# Patient Record
Sex: FEMALE | Race: ASIAN | NOT HISPANIC OR LATINO | URBAN - METROPOLITAN AREA
[De-identification: names, ages, dates, MRNs, and addresses within clinical notes are randomized per-mention and may not be internally consistent; named-entity substitution may affect disease eponyms.]

---

## 2023-11-23 ENCOUNTER — HOSPITAL ENCOUNTER (EMERGENCY)
Facility: HOSPITAL | Age: 48
Discharge: HOME | End: 2023-11-23
Attending: EMERGENCY MEDICINE
Payer: COMMERCIAL

## 2023-11-23 VITALS
DIASTOLIC BLOOD PRESSURE: 64 MMHG | OXYGEN SATURATION: 100 % | RESPIRATION RATE: 18 BRPM | TEMPERATURE: 98 F | BODY MASS INDEX: 18.31 KG/M2 | SYSTOLIC BLOOD PRESSURE: 128 MMHG | HEART RATE: 74 BPM | WEIGHT: 97 LBS | HEIGHT: 61 IN

## 2023-11-23 DIAGNOSIS — I31.39 PERICARDIAL EFFUSION WITHOUT CARDIAC TAMPONADE: ICD-10-CM

## 2023-11-23 DIAGNOSIS — R10.32 LEFT LOWER QUADRANT ABDOMINAL PAIN: Primary | ICD-10-CM

## 2023-11-23 LAB
ALBUMIN SERPL BCP-MCNC: 3.9 G/DL (ref 3.5–5.2)
ALP SERPL-CCNC: 46 U/L (ref 55–135)
ALT SERPL W/O P-5'-P-CCNC: 13 U/L (ref 10–44)
ANION GAP SERPL CALC-SCNC: 8 MMOL/L (ref 8–16)
AST SERPL-CCNC: 16 U/L (ref 10–40)
B-HCG UR QL: NEGATIVE
BASOPHILS # BLD AUTO: 0.05 K/UL (ref 0–0.2)
BASOPHILS NFR BLD: 0.7 % (ref 0–1.9)
BILIRUB SERPL-MCNC: 0.3 MG/DL (ref 0.1–1)
BILIRUB UR QL STRIP: NEGATIVE
BILIRUB UR QL STRIP: NEGATIVE
BUN SERPL-MCNC: 18 MG/DL (ref 6–20)
CALCIUM SERPL-MCNC: 8.8 MG/DL (ref 8.7–10.5)
CHLORIDE SERPL-SCNC: 107 MMOL/L (ref 95–110)
CLARITY UR: CLEAR
CLARITY UR: CLEAR
CO2 SERPL-SCNC: 22 MMOL/L (ref 23–29)
COLOR UR: COLORLESS
COLOR UR: COLORLESS
CREAT SERPL-MCNC: 0.9 MG/DL (ref 0.5–1.4)
CTP QC/QA: YES
DIFFERENTIAL METHOD: ABNORMAL
EOSINOPHIL # BLD AUTO: 0.2 K/UL (ref 0–0.5)
EOSINOPHIL NFR BLD: 3 % (ref 0–8)
ERYTHROCYTE [DISTWIDTH] IN BLOOD BY AUTOMATED COUNT: 13.5 % (ref 11.5–14.5)
EST. GFR  (NO RACE VARIABLE): >60 ML/MIN/1.73 M^2
GLUCOSE SERPL-MCNC: 93 MG/DL (ref 70–110)
GLUCOSE UR QL STRIP: NEGATIVE
GLUCOSE UR QL STRIP: NEGATIVE
HCT VFR BLD AUTO: 41.6 % (ref 37–48.5)
HGB BLD-MCNC: 14 G/DL (ref 12–16)
HGB UR QL STRIP: NEGATIVE
HGB UR QL STRIP: NEGATIVE
IMM GRANULOCYTES # BLD AUTO: 0.01 K/UL (ref 0–0.04)
IMM GRANULOCYTES NFR BLD AUTO: 0.1 % (ref 0–0.5)
KETONES UR QL STRIP: NEGATIVE
KETONES UR QL STRIP: NEGATIVE
LEUKOCYTE ESTERASE UR QL STRIP: NEGATIVE
LEUKOCYTE ESTERASE UR QL STRIP: NEGATIVE
LIPASE SERPL-CCNC: 43 U/L (ref 4–60)
LYMPHOCYTES # BLD AUTO: 1.7 K/UL (ref 1–4.8)
LYMPHOCYTES NFR BLD: 23.7 % (ref 18–48)
MCH RBC QN AUTO: 30.4 PG (ref 27–31)
MCHC RBC AUTO-ENTMCNC: 33.7 G/DL (ref 32–36)
MCV RBC AUTO: 90 FL (ref 82–98)
MONOCYTES # BLD AUTO: 0.7 K/UL (ref 0.3–1)
MONOCYTES NFR BLD: 9.5 % (ref 4–15)
NEUTROPHILS # BLD AUTO: 4.4 K/UL (ref 1.8–7.7)
NEUTROPHILS NFR BLD: 63 % (ref 38–73)
NITRITE UR QL STRIP: NEGATIVE
NITRITE UR QL STRIP: NEGATIVE
NRBC BLD-RTO: 0 /100 WBC
PH UR STRIP: 6 [PH] (ref 5–8)
PH UR STRIP: 6 [PH] (ref 5–8)
PLATELET # BLD AUTO: 304 K/UL (ref 150–450)
PMV BLD AUTO: 9 FL (ref 9.2–12.9)
POTASSIUM SERPL-SCNC: 4.1 MMOL/L (ref 3.5–5.1)
PROT SERPL-MCNC: 7.3 G/DL (ref 6–8.4)
PROT UR QL STRIP: NEGATIVE
PROT UR QL STRIP: NEGATIVE
RBC # BLD AUTO: 4.61 M/UL (ref 4–5.4)
SODIUM SERPL-SCNC: 137 MMOL/L (ref 136–145)
SP GR UR STRIP: 1 (ref 1–1.03)
SP GR UR STRIP: 1 (ref 1–1.03)
URN SPEC COLLECT METH UR: ABNORMAL
URN SPEC COLLECT METH UR: ABNORMAL
UROBILINOGEN UR STRIP-ACNC: NEGATIVE EU/DL
UROBILINOGEN UR STRIP-ACNC: NEGATIVE EU/DL
WBC # BLD AUTO: 7.04 K/UL (ref 3.9–12.7)

## 2023-11-23 PROCEDURE — 96374 THER/PROPH/DIAG INJ IV PUSH: CPT

## 2023-11-23 PROCEDURE — 81025 URINE PREGNANCY TEST: CPT | Performed by: PHYSICIAN ASSISTANT

## 2023-11-23 PROCEDURE — 96375 TX/PRO/DX INJ NEW DRUG ADDON: CPT

## 2023-11-23 PROCEDURE — 63600175 PHARM REV CODE 636 W HCPCS: Performed by: STUDENT IN AN ORGANIZED HEALTH CARE EDUCATION/TRAINING PROGRAM

## 2023-11-23 PROCEDURE — 80053 COMPREHEN METABOLIC PANEL: CPT | Performed by: STUDENT IN AN ORGANIZED HEALTH CARE EDUCATION/TRAINING PROGRAM

## 2023-11-23 PROCEDURE — 85014 HEMATOCRIT: CPT

## 2023-11-23 PROCEDURE — 81003 URINALYSIS AUTO W/O SCOPE: CPT | Performed by: PHYSICIAN ASSISTANT

## 2023-11-23 PROCEDURE — 84132 ASSAY OF SERUM POTASSIUM: CPT

## 2023-11-23 PROCEDURE — 84295 ASSAY OF SERUM SODIUM: CPT

## 2023-11-23 PROCEDURE — 93005 ELECTROCARDIOGRAM TRACING: CPT

## 2023-11-23 PROCEDURE — 85025 COMPLETE CBC W/AUTO DIFF WBC: CPT | Performed by: STUDENT IN AN ORGANIZED HEALTH CARE EDUCATION/TRAINING PROGRAM

## 2023-11-23 PROCEDURE — 99285 EMERGENCY DEPT VISIT HI MDM: CPT | Mod: 25

## 2023-11-23 PROCEDURE — 81003 URINALYSIS AUTO W/O SCOPE: CPT | Mod: 91 | Performed by: STUDENT IN AN ORGANIZED HEALTH CARE EDUCATION/TRAINING PROGRAM

## 2023-11-23 PROCEDURE — 82565 ASSAY OF CREATININE: CPT

## 2023-11-23 PROCEDURE — 83690 ASSAY OF LIPASE: CPT | Performed by: STUDENT IN AN ORGANIZED HEALTH CARE EDUCATION/TRAINING PROGRAM

## 2023-11-23 PROCEDURE — 82330 ASSAY OF CALCIUM: CPT

## 2023-11-23 PROCEDURE — 93010 ELECTROCARDIOGRAM REPORT: CPT | Mod: ,,, | Performed by: INTERNAL MEDICINE

## 2023-11-23 PROCEDURE — 99900035 HC TECH TIME PER 15 MIN (STAT)

## 2023-11-23 PROCEDURE — 93010 EKG 12-LEAD: ICD-10-PCS | Mod: ,,, | Performed by: INTERNAL MEDICINE

## 2023-11-23 RX ORDER — KETOROLAC TROMETHAMINE 30 MG/ML
15 INJECTION, SOLUTION INTRAMUSCULAR; INTRAVENOUS
Status: COMPLETED | OUTPATIENT
Start: 2023-11-23 | End: 2023-11-23

## 2023-11-23 RX ORDER — MORPHINE SULFATE 4 MG/ML
4 INJECTION, SOLUTION INTRAMUSCULAR; INTRAVENOUS
Status: COMPLETED | OUTPATIENT
Start: 2023-11-23 | End: 2023-11-23

## 2023-11-23 RX ORDER — ONDANSETRON 2 MG/ML
4 INJECTION INTRAMUSCULAR; INTRAVENOUS
Status: COMPLETED | OUTPATIENT
Start: 2023-11-23 | End: 2023-11-23

## 2023-11-23 RX ADMIN — KETOROLAC TROMETHAMINE 15 MG: 30 INJECTION, SOLUTION INTRAMUSCULAR; INTRAVENOUS at 09:11

## 2023-11-23 RX ADMIN — MORPHINE SULFATE 4 MG: 4 INJECTION, SOLUTION INTRAMUSCULAR; INTRAVENOUS at 09:11

## 2023-11-23 RX ADMIN — ONDANSETRON 4 MG: 2 INJECTION INTRAMUSCULAR; INTRAVENOUS at 09:11

## 2023-11-24 NOTE — DISCHARGE INSTRUCTIONS
You were evaluated and treated in the emergency department today. You were found to have a diagnoses that can be managed well at home, however that requires your commitment to getting better.   Problem-Specific Instructions:  Follow-up with OBGYN.  Follow-up primary care.  Follow-up with cardiology.  Return to the emergency room for any new or worsening symptoms..      Ensure you follow up with your Primary Care Provider or any additional providers listed on this discharge sheet. While you may be healthy enough to go home today, I cannot predict the exact course of your diagnoses. As such, it is your responsibility to monitor symptoms, follow-up with another healthcare provider, or return to the emergency room for new or worsening concerns. Unless otherwise instructed, continue all home medications and any new medications prescribed to you in the Emergency Department.   General Maintenance: Ensure adequate hydration to prevent prolonged illness and recovery. Monitor your caloric intake with a goal of obtaining and maintaining a healthy weight to help prevent the development of chronic and life-threatening medical conditions. Start healthy fitness habits and aim for a goal of 30 minutes to an hour of exercise 3-5 times a week. Avoid the use of tobacco, alcohol, and illicit drugs as these may be detrimental to your health goals.

## 2023-11-24 NOTE — ED TRIAGE NOTES
Pt arrived to ED with a c/o LLQ abdominal pain radiating to back since Sunday increased severity since tonight along with nausea. Pt was seen by her ob/gyn and was treated with antibiotic for UTI but mentioned her urine culture came negative. Pt is restless on arrival to ED. Pt is alert and oriented.

## 2023-11-24 NOTE — ED PROVIDER NOTES
Encounter Date: 2023    SCRIBE #1 NOTE: I, Padmaja Styles, am scribing for, and in the presence of,  Davian Quigley PA-C. I have scribed the following portions of the note - Other sections scribed: HPI/ROS.       History     Chief Complaint   Patient presents with    Abdominal Pain     Pt reports shooting sharp pain to LLQ that radiates to her back, urinary frequency, dysuria since .  She had a obgyn appt the following Monday was tested for UTI which was negative.       48 y.o. female, with a PMHx of HLD, who presents to the ED with abdominal pain onset . Patient reports left lower quadrant sharp, shooting pain that radiates bilaterally to lower back.  Patient tells me it is intermittent in nature but acutely worsened today.  She is been taking Tylenol and Motrin as needed for pain with improvement.  There is associated nausea and vomiting as well as urinary frequency. No other exacerbating or alleviating factors.     Complains of episodes of pain in which she has leg numbness but denies perineal numbness, gait abnormality, loss or retention of bowel or bladder.  Denies fever, chills.  Denies history of IV drug use.  Denies history of malignancy.      The history is provided by the patient and the spouse. No  was used.     Review of patient's allergies indicates:  No Known Allergies  Past Medical History:   Diagnosis Date    Hyperlipidemia      Past Surgical History:   Procedure Laterality Date     SECTION      TUBAL LIGATION       Family History   Problem Relation Age of Onset    Breast cancer Neg Hx     Colon cancer Neg Hx     Ovarian cancer Neg Hx      Social History     Tobacco Use    Smoking status: Never     Review of Systems   Constitutional:  Negative for chills and fever.   HENT:  Negative for congestion, rhinorrhea and sore throat.    Eyes:  Negative for visual disturbance.   Respiratory:  Negative for cough and shortness of breath.    Cardiovascular:  Negative  for chest pain.   Gastrointestinal:  Positive for abdominal pain (LLQ), nausea and vomiting. Negative for diarrhea.   Genitourinary:  Positive for frequency. Negative for dysuria and hematuria.   Musculoskeletal:  Negative for back pain.   Skin:  Negative for rash.   Neurological:  Negative for dizziness, weakness and headaches. Numbness: in legs post emesis.      Physical Exam     Initial Vitals [11/23/23 2035]   BP Pulse Resp Temp SpO2   135/67 94 16 97.8 °F (36.6 °C) 99 %      MAP       --         Physical Exam    Nursing note and vitals reviewed.  Constitutional: She appears well-developed and well-nourished.   HENT:   Head: Normocephalic and atraumatic.   Eyes: Conjunctivae are normal. Pupils are equal, round, and reactive to light.   Neck: Neck supple.   Normal range of motion.  Cardiovascular:  Normal rate, regular rhythm and normal heart sounds.           Pulmonary/Chest: Breath sounds normal.   Abdominal:   Exam significant for TTP left lower quadrant.    Abdomen otherwise is nondistended, soft and nontender in all other quadrants. Normoactive bowel sounds. Nonperitoneal, no guarding or rigidity. No palpable masses or hepatosplenomegaly.  No suprapubic pain. No CVAT.    No overlying skin changes.   Distal pulses 2+ b/l.     Additional Tests //  (-)  Henderson's sign.   (-)  Rebound Tenderness  (-)  Psoas Sign  (-)  Heel Tap     Musculoskeletal:         General: Normal range of motion.      Cervical back: Normal range of motion and neck supple.     Neurological: She is alert and oriented to person, place, and time. GCS score is 15. GCS eye subscore is 4. GCS verbal subscore is 5. GCS motor subscore is 6.   Skin: Skin is warm and dry. Capillary refill takes less than 2 seconds.   Psychiatric: She has a normal mood and affect. Her behavior is normal. Judgment and thought content normal.         ED Course   Procedures  Labs Reviewed   URINALYSIS - Abnormal; Notable for the following components:       Result Value     Color, UA Colorless (*)     All other components within normal limits   CBC W/ AUTO DIFFERENTIAL - Abnormal; Notable for the following components:    MPV 9.0 (*)     All other components within normal limits   COMPREHENSIVE METABOLIC PANEL - Abnormal; Notable for the following components:    CO2 22 (*)     Alkaline Phosphatase 46 (*)     All other components within normal limits   URINALYSIS, REFLEX TO URINE CULTURE - Abnormal; Notable for the following components:    Color, UA Colorless (*)     All other components within normal limits    Narrative:     Specimen Source->Urine   LIPASE   POCT URINE PREGNANCY   ISTAT CHEM8          Imaging Results              US Pelvis Comp with Transvag NON-OB (xpd) (Final result)  Result time 11/23/23 23:19:04   Procedure changed from US Pelvis Complete Non OB     Final result by Anika Christensen MD (11/23/23 23:19:04)                   Impression:      No uterine masses.    Dominant left ovarian follicle.      Electronically signed by: Anika Christensen  Date:    11/23/2023  Time:    23:19               Narrative:    EXAMINATION:  PELVIC ULTRASOUND    CLINICAL HISTORY:  LLQ PAIN;    TECHNIQUE:  Real-time ultrasound of the pelvis was performed transabdominally and transvaginally.    COMPARISON:  CT abdomen pelvis 11/23/2023    FINDINGS:  The retroverted uterus measures 8.6 x 4.8 x 6.  The endometrial stripe measures 6 mm.  There are no uterine masses.  Nabothian cysts are present.    The right ovary measures 1.1 x 2.6 x 3.4 cm.  Arterial and venous flow are present.    The left ovary measures 3.0 x 2.0 x 3.3 cm.  There is a dominant left ovarian follicle measuring 2.4 x 1.9 x 1.8 cm.  Arterial and venous flow are present.    There is small free fluid in the pelvis.                                       CT Abdomen Pelvis  Without Contrast (Final result)  Result time 11/23/23 22:34:03      Final result by Anika Christensen MD (11/23/23 22:34:03)                   Impression:       Probable small hepatic cysts.    Dominant left ovarian follicle.  Small free pelvic fluid.    Small pericardial effusion.      Electronically signed by: Anika Christensen  Date:    11/23/2023  Time:    22:34               Narrative:    EXAMINATION:  CT ABDOMEN PELVIS WITHOUT    CLINICAL HISTORY:  Shooting sharp pain to the left lower quadrant that radiates to her back.  Urinary frequency and dysuria since on the.    TECHNIQUE:  5 mm unenhanced axial images from the lung bases through the greater trochanters were performed.  Coronal and sagittal reformatted images were provided.    COMPARISON:  None.    FINDINGS:  Within the limits of a noncontrast examination, there are few low-attenuation lesions in liver.  The largest measures approximately 1.5 cm has imaging characteristics that of a simple cyst.    Spleen, pancreas, kidneys, and adrenal glands are unremarkable.  The gallbladder contains no calcified gallstones..    There is no gross abdominal adenopathy or ascites.    There are no pelvic masses or adenopathy.  There is small free pelvic fluid.  The appendix is not inflamed.  There is a dominant left ovarian follicle, which measures approximately 2.1 x 1.5 cm (series 2 axial image 129).  The appendix is not inflamed.    At the lung bases, there is mild bibasilar atelectatic change.  Small pericardial effusion is seen.  There is small endometrial fluid.  The uterus is retroverted.    Mild levoscoliosis is present.                                       Medications   ketorolac injection 15 mg (15 mg Intravenous Given 11/23/23 2129)   morphine injection 4 mg (4 mg Intravenous Given 11/23/23 2130)   ondansetron injection 4 mg (4 mg Intravenous Given 11/23/23 2129)     Medical Decision Making  Patient presents for emergent evaluation of left lower quadrant abdominal pain radiating into her back. Workup today consistent with likely left lower quadrant pain secondary to menstrual cycle.  Differential includes ovarian  torsion, diverticulitis/diverticulosis, atypical presentation of appendicitis, gastritis, uterine cramping, PID, UTI, nephrolithiasis.  Patient is nontoxic and well appearing, Afebrile with stable vital signs.  Labs were ordered and documented in the ED course.  Notable for no evidence of infection, significant metabolic abnormality, no evidence of UTI.  Imaging was ordered and documented in the ED course.  CT noncontrast abdomen notable for small hepatic cysts, dominant left ovarian follicle and incidental finding of small pericardial effusion.  Pelvic ultrasound notable for dominant left ovarian follicle without evidence of ovarian torsion.  EKG without evidence of acute ischemia.    The treatment they received in the emergency department included morphine, Toradol.   I addressed the following problems in addition to the chief complaint:  Patient found to have incidental finding of small pericardial effusion.  Her EKG has no evidence of acute ischemia however is abnormal.  In setting of abnormal EKG and small pericardial effusion, have placed referral to Cardiology.    Given patient presentation and workup, doubt emergent or surgical pathology necessitating consultation or admission from the emergency department.  I discussed the findings with the patient.  I discussed strict and strong return precautions. They will be discharged home in stable condition.      Amount and/or Complexity of Data Reviewed  Labs: ordered. Decision-making details documented in ED Course.  Radiology: ordered. Decision-making details documented in ED Course.    Risk  Prescription drug management.            Scribe Attestation:   Scribe #1: I performed the above scribed service and the documentation accurately describes the services I performed. I attest to the accuracy of the note.        ED Course as of 11/23/23 2346   Thu Nov 23, 2023 2128 CBC W/ AUTO DIFFERENTIAL(!)  No hemodynamically significant anemia, leukocytosis.  Normal  platelets. [AN]   2128 Urinalysis, Reflex to Urine Culture Urine, Clean Catch(!)  No evidence of UTI [AN]   2145 Comp. Metabolic Panel(!)  No significant metabolic abnormality. [AN]   2145 Lipase [AN]   2145 Preg Test, Ur: Negative [AN]   2240 CT Abdomen Pelvis  Without Contrast  Probable small hepatic cysts.     Dominant left ovarian follicle.  Small free pelvic fluid.     Small pericardial effusion.   [AN]   2305 EKG independently interpreted by me.  Demonstrates normal sinus rhythm rate of 68 beats per minute.  There is a rightward axis, normal intervals.  No acute ST elevation, ST depression T-wave inversion to suggest ischemia.  No STEMI. [AN]      ED Course User Index  [AN] Davian Quigley PA-C                          Clinical Impression:  Final diagnoses:  [R10.32] Left lower quadrant abdominal pain (Primary)  [I31.39] Pericardial effusion without cardiac tamponade              Davian ADEN PA-C, personally performed the services described in this documentation. All medical record entries made by the scribe were at my direction and in my presence. I have reviewed the chart and agree that the record reflects my personal performance and is accurate and complete.      Davian Quigley PA-C  11/23/23 1565

## 2023-11-29 ENCOUNTER — OFFICE VISIT (OUTPATIENT)
Dept: UROLOGY | Facility: CLINIC | Age: 48
End: 2023-11-29
Payer: COMMERCIAL

## 2023-11-29 ENCOUNTER — PATIENT MESSAGE (OUTPATIENT)
Dept: UROLOGY | Facility: CLINIC | Age: 48
End: 2023-11-29

## 2023-11-29 VITALS — BODY MASS INDEX: 18.37 KG/M2 | WEIGHT: 97.25 LBS

## 2023-11-29 DIAGNOSIS — N80.9 ENDOMETRIOSIS: ICD-10-CM

## 2023-11-29 DIAGNOSIS — R10.32 LEFT LOWER QUADRANT ABDOMINAL PAIN: ICD-10-CM

## 2023-11-29 DIAGNOSIS — N32.81 OAB (OVERACTIVE BLADDER): Primary | ICD-10-CM

## 2023-11-29 PROCEDURE — 99203 PR OFFICE/OUTPT VISIT, NEW, LEVL III, 30-44 MIN: ICD-10-PCS | Mod: S$GLB,,, | Performed by: STUDENT IN AN ORGANIZED HEALTH CARE EDUCATION/TRAINING PROGRAM

## 2023-11-29 PROCEDURE — 3008F PR BODY MASS INDEX (BMI) DOCUMENTED: ICD-10-PCS | Mod: CPTII,S$GLB,, | Performed by: STUDENT IN AN ORGANIZED HEALTH CARE EDUCATION/TRAINING PROGRAM

## 2023-11-29 PROCEDURE — 1160F PR REVIEW ALL MEDS BY PRESCRIBER/CLIN PHARMACIST DOCUMENTED: ICD-10-PCS | Mod: CPTII,S$GLB,, | Performed by: STUDENT IN AN ORGANIZED HEALTH CARE EDUCATION/TRAINING PROGRAM

## 2023-11-29 PROCEDURE — 1159F PR MEDICATION LIST DOCUMENTED IN MEDICAL RECORD: ICD-10-PCS | Mod: CPTII,S$GLB,, | Performed by: STUDENT IN AN ORGANIZED HEALTH CARE EDUCATION/TRAINING PROGRAM

## 2023-11-29 PROCEDURE — 87563 M. GENITALIUM AMP PROBE: CPT | Performed by: STUDENT IN AN ORGANIZED HEALTH CARE EDUCATION/TRAINING PROGRAM

## 2023-11-29 PROCEDURE — 99999 PR PBB SHADOW E&M-EST. PATIENT-LVL III: CPT | Mod: PBBFAC,,, | Performed by: STUDENT IN AN ORGANIZED HEALTH CARE EDUCATION/TRAINING PROGRAM

## 2023-11-29 PROCEDURE — 1160F RVW MEDS BY RX/DR IN RCRD: CPT | Mod: CPTII,S$GLB,, | Performed by: STUDENT IN AN ORGANIZED HEALTH CARE EDUCATION/TRAINING PROGRAM

## 2023-11-29 PROCEDURE — 87798 DETECT AGENT NOS DNA AMP: CPT | Mod: 59 | Performed by: STUDENT IN AN ORGANIZED HEALTH CARE EDUCATION/TRAINING PROGRAM

## 2023-11-29 PROCEDURE — 3008F BODY MASS INDEX DOCD: CPT | Mod: CPTII,S$GLB,, | Performed by: STUDENT IN AN ORGANIZED HEALTH CARE EDUCATION/TRAINING PROGRAM

## 2023-11-29 PROCEDURE — 1159F MED LIST DOCD IN RCRD: CPT | Mod: CPTII,S$GLB,, | Performed by: STUDENT IN AN ORGANIZED HEALTH CARE EDUCATION/TRAINING PROGRAM

## 2023-11-29 PROCEDURE — 99999 PR PBB SHADOW E&M-EST. PATIENT-LVL III: ICD-10-PCS | Mod: PBBFAC,,, | Performed by: STUDENT IN AN ORGANIZED HEALTH CARE EDUCATION/TRAINING PROGRAM

## 2023-11-29 PROCEDURE — 99203 OFFICE O/P NEW LOW 30 MIN: CPT | Mod: S$GLB,,, | Performed by: STUDENT IN AN ORGANIZED HEALTH CARE EDUCATION/TRAINING PROGRAM

## 2023-11-29 RX ORDER — OXYBUTYNIN CHLORIDE 10 MG/1
10 TABLET, EXTENDED RELEASE ORAL DAILY
Qty: 90 TABLET | Refills: 0 | Status: SHIPPED | OUTPATIENT
Start: 2023-11-29 | End: 2024-02-27

## 2023-11-29 NOTE — PROGRESS NOTES
Patient ID: Jaz Hernandez is a 48 y.o. female.    Chief Complaint:Urinary frequency    Referral: Davian Quigley, YANETH  [address] [facility] [address]     [facility]  48 y.o. who presents to the Urology clinic for evaluation of urinary frequency and dysuria. UA from  when patient presented to ED for her concerns did not reveal concern for UTI, no urine culture was sent off. No documented hx of recurrent UTI. She has an enlarged ovarian cyst on the contralateral side of her pain. She voids q 1 hr previously now she voids q 30 mins, she is distressed by her symptoms. Denies pain with sexual activity. Water intake- 6 bottles day  Drinks 1 cup of coffee daily, usually avoids bladder irritants.  Hx of endometriosis for 2 years associated with heavy bleeding which resolved earlier this year without surgery ( previously planned hysterectomy) . Patient notes her lower abdominal cramping is similar to her endometriosis episodes. While she experienced heavy bleeding for her GYN condition she was placed on iron which resulted in constipation, her constipation has now improved and she has a BM daily.   Medically Necessary ROS documented in HPI    Past Medical History  Active Ambulatory Problems     Diagnosis Date Noted    No Active Ambulatory Problems     Resolved Ambulatory Problems     Diagnosis Date Noted    No Resolved Ambulatory Problems     Past Medical History:   Diagnosis Date    Hyperlipidemia          Past Surgical History  Past Surgical History:   Procedure Laterality Date     SECTION      TUBAL LIGATION         Social History  Social Connections: Unknown (2023)    Social Connection and Isolation Panel [NHANES]     Frequency of Communication with Friends and Family: More than three times a week     Frequency of Social Gatherings with Friends and Family: Once a week     Attends [employer] Services: Not on file     Active Member of Clubs or Organizations: Yes     Attends Club or Organization Meetings: 1  to 4 times per year     Marital Status:        Medications    Current Outpatient Medications:     LIVALO 2 mg Tab tablet, TK 1 T PO QHS, Disp: , Rfl: 3    Allergies  Review of patient's allergies indicates:  No Known Allergies    Patient's PMH, FH, Social hx, Medications, allergies reviewed and updated as pertinent to today's visit    Objective:      Physical Exam  Constitutional:       Appearance: She is well-developed.   Eyes:      Conjunctiva/sclera: Conjunctivae normal.   Pulmonary:      Effort: Pulmonary effort is normal. No respiratory distress.   Abdominal:      General: There is no distension.      Palpations: Abdomen is soft. There is no mass.      Tenderness: There is no abdominal tenderness. There is no guarding.   Skin:     General: Skin is warm.      Findings: No rash.   Neurological:      Mental Status: She is alert and oriented to person, place, and time.   Psychiatric:         Behavior: Behavior normal.         Imaging results: personally reviewed imaging with the following findings  No stones, no renal masses, no hydronephrosis bilaterally    Assessment:       1. OAB (overactive bladder)    2. Left lower quadrant abdominal pain    3. Endometriosis        Plan:       No evidence of UTI, will screen for atypical bacterial  No obstructive uropathy on imaging       Consider OAB as etiology of symptoms  Trial of oxybutynin provided, discussed side effects of dry eye, dry mouth, constipation  Discussed mirabegron has increased risk of HTN for some patients, can consider if oxybutynin works  SUFU OAB handout provided    If symptoms fail to resolve, I suspect her symptoms her related to her history of endometriosis and she should continue follow up with her GYN

## 2023-12-01 LAB
M GENITALIUM DNA UR QL NAA+PROBE: NEGATIVE
SPECIMEN SOURCE: NORMAL

## 2023-12-03 LAB
SPECIMEN SOURCE: NORMAL
U PARVUM DNA SPEC QL NAA+PROBE: NEGATIVE
U UREALYTICUM DNA SPEC QL NAA+PROBE: NEGATIVE

## 2025-02-10 ENCOUNTER — OFFICE VISIT (OUTPATIENT)
Dept: OBSTETRICS AND GYNECOLOGY | Facility: CLINIC | Age: 50
End: 2025-02-10
Payer: COMMERCIAL

## 2025-02-10 ENCOUNTER — LAB VISIT (OUTPATIENT)
Dept: LAB | Facility: HOSPITAL | Age: 50
End: 2025-02-10
Attending: OBSTETRICS & GYNECOLOGY
Payer: COMMERCIAL

## 2025-02-10 VITALS — BODY MASS INDEX: 18.62 KG/M2 | SYSTOLIC BLOOD PRESSURE: 110 MMHG | DIASTOLIC BLOOD PRESSURE: 80 MMHG | WEIGHT: 98.56 LBS

## 2025-02-10 DIAGNOSIS — Z01.419 WELL WOMAN EXAM WITH ROUTINE GYNECOLOGICAL EXAM: Primary | ICD-10-CM

## 2025-02-10 DIAGNOSIS — N64.52 NIPPLE DISCHARGE: ICD-10-CM

## 2025-02-10 DIAGNOSIS — R30.0 DYSURIA: ICD-10-CM

## 2025-02-10 DIAGNOSIS — Z12.4 ENCOUNTER FOR PAPANICOLAOU SMEAR FOR CERVICAL CANCER SCREENING: ICD-10-CM

## 2025-02-10 DIAGNOSIS — R10.30 LOWER ABDOMINAL PAIN: ICD-10-CM

## 2025-02-10 DIAGNOSIS — Z12.31 BREAST CANCER SCREENING BY MAMMOGRAM: ICD-10-CM

## 2025-02-10 LAB — PROLACTIN SERPL IA-MCNC: 52 NG/ML (ref 5.2–26.5)

## 2025-02-10 PROCEDURE — 87086 URINE CULTURE/COLONY COUNT: CPT | Performed by: OBSTETRICS & GYNECOLOGY

## 2025-02-10 PROCEDURE — 84146 ASSAY OF PROLACTIN: CPT | Performed by: OBSTETRICS & GYNECOLOGY

## 2025-02-10 PROCEDURE — 99999 PR PBB SHADOW E&M-EST. PATIENT-LVL III: CPT | Mod: PBBFAC,,, | Performed by: OBSTETRICS & GYNECOLOGY

## 2025-02-10 PROCEDURE — 88175 CYTOPATH C/V AUTO FLUID REDO: CPT | Performed by: OBSTETRICS & GYNECOLOGY

## 2025-02-10 PROCEDURE — 36415 COLL VENOUS BLD VENIPUNCTURE: CPT | Performed by: OBSTETRICS & GYNECOLOGY

## 2025-02-10 PROCEDURE — 87624 HPV HI-RISK TYP POOLED RSLT: CPT | Performed by: OBSTETRICS & GYNECOLOGY

## 2025-02-10 NOTE — PROGRESS NOTES
SUBJECTIVE:   Jaz Hernandez is a 49 y.o. female   for annual well woman exam. Patient's last menstrual period was 2025..    She has a few concerns. See list below.      Contraception: BTL    Past Medical History:   Diagnosis Date    Hyperlipidemia      Past Surgical History:   Procedure Laterality Date     SECTION      TUBAL LIGATION       Social History     Socioeconomic History    Marital status:    Tobacco Use    Smoking status: Never     Social Drivers of Health     Financial Resource Strain: Low Risk  (2/3/2025)    Overall Financial Resource Strain (CARDIA)     Difficulty of Paying Living Expenses: Not very hard   Food Insecurity: No Food Insecurity (2/3/2025)    Hunger Vital Sign     Worried About Running Out of Food in the Last Year: Never true     Ran Out of Food in the Last Year: Never true   Transportation Needs: No Transportation Needs (2023)    PRAPARE - Transportation     Lack of Transportation (Medical): No     Lack of Transportation (Non-Medical): No   Physical Activity: Insufficiently Active (2/3/2025)    Exercise Vital Sign     Days of Exercise per Week: 2 days     Minutes of Exercise per Session: 10 min   Stress: Stress Concern Present (2/3/2025)    [employer] - Occupational Stress Questionnaire     Feeling of Stress : To some extent   Housing Stability: Low Risk  (2023)    Housing Stability Vital Sign     Unable to Pay for Housing in the Last Year: No     Number of Places Lived in the Last Year: 1     Unstable Housing in the Last Year: No     Family History   Problem Relation Name Age of Onset    Breast cancer Neg Hx      Colon cancer Neg Hx      Ovarian cancer Neg Hx       OB History    Para Term  AB Living   3 3           SAB IAB Ectopic Multiple Live Births                  # Outcome Date GA Lbr Ori/2nd Weight Sex Type Anes PTL Lv   3 Para            2 Para            1 Para                  Current Outpatient  "Medications   Medication Sig Dispense Refill    oxybutynin (DITROPAN-XL) 10 MG 24 hr tablet Take 1 tablet (10 mg total) by mouth once daily. 90 tablet 0     No current facility-administered medications for this visit.     Allergies: Patient has no known allergies.       ROS:  GENERAL: Denies weight gain or weight loss. Feeling well overall.   SKIN: Denies rash or lesions.   HEAD: Denies head injury or headache.   NODES: Denies enlarged lymph nodes.   CHEST: Denies chest pain or shortness of breath.   CARDIOVASCULAR: Denies palpitations or left sided chest pain.   ABDOMEN: No abdominal pain, constipation, diarrhea, nausea, vomiting or rectal bleeding.   URINARY:see HPI  REPRODUCTIVE:see HPI  BREASTS: The patient performs breast self-examination and denies pain, lumps, or nipple discharge.   HEMATOLOGIC: No easy bruisability or excessive bleeding.  MUSCULOSKELETAL: Denies joint pain or swelling.   NEUROLOGIC: Denies syncope or weakness.   PSYCHIATRIC: Denies depression, anxiety or mood swings.      OBJECTIVE:   /80   Wt 44.7 kg (98 lb 8.7 oz)   LMP 01/19/2025   BMI 18.62 kg/m²   The patient appears well, alert, oriented x 3, in no distress.  PSYCH:  Normal, full range of affect  NECK: negative, no thyromegaly, trachea midline  SKIN: normal, good color, good turgor and no acne, striae, hirsutism  BREAST EXAM: breasts appear normal, no suspicious masses, no skin or nipple changes or axillary nodes  ABDOMEN: soft, non-tender; bowel sounds normal; no masses,  no organomegaly and no hernias, masses, or hepatosplenomegaly  GENITALIA: normal external genitalia, no erythema, no discharge  BLADDER: soft  URETHRA: normal appearing urethra with no masses, tenderness or lesions and normal urethra, normal urethral meatus  VAGINA: Normal  CERVIX: no lesions or cervical motion tenderness  UTERUS: retroverted uterus  ADNEXA: no mass, fullness, tenderness    ASSESSMENT:   .Jaz Macedo" was seen today for well " woman.    Diagnoses and all orders for this visit:    Well woman exam with routine gynecological exam    Encounter for Papanicolaou smear for cervical cancer screening  -     Liquid-Based Pap Smear, Screening  -     HPV High Risk Genotypes, PCR    Breast cancer screening by mammogram  -     Mammo Digital Screening Bilat w/ [place]; Future  -     US Breast Right Complete; Future  -     Mammo Digital Screening Bilat w/ [place]; Future    Lower abdominal pain  -     US Pelvis Comp with Transvag NON-OB (xpd; Future    Nipple discharge  -     PROLACTIN; Future    Dysuria  -     Urine Culture High Risk        1. Health maintenance  -pap done. Discussed ASCCP guideline screening every 3 - 5 years.   -screening MMG ordered  -colonoscopy ordered  -counseled on exercise and healthy diet, weight loss  -bone health:  Discussed Vitamin D and Calcium supplementation, weight bearing exercises  2.  Discussed safety at home/school/work, healthy and balanced diet, sleep hygiene, as well as violence/weapons exposure.         CC:  nipple discharge, LLQ pain and dysuria    HPI:     Nipple discharge:   + nipple discharge x one year, with  malodor, clear discharge, spontaneous, only noted on right nipple    2.  LLQ pain x years. There was a concern for diverticulitis? Diverticulosis.  Recently had EGD with c-scope   [facility].  Pt reported having abdominal pain prior to having BM, she would get constipation then the BM becomes watery and lose, does not feel like she empty it well  Uncertain if she has IBS    3.  H/o ovarian cyst, and has been f/u with [facility].  Her last US in 12/2023 showed a left dominant follicle.     was told she may have endometriosis,  had two years h/o heavy bleeding which stopped spontaneously- was supposed to have a hysterectomy. F/b C  Pt reported now period have been monthly and regular     4.  Dysuria  One week h/o painful urination. And urgency.  She is taking ditropan for urinary incontinence     PE:  ABDOMEN:  soft, non-tender; bowel sounds normal; no masses,  no organomegaly and no hernias, masses, or hepatosplenomegaly  GENITALIA: normal external genitalia, no erythema, no discharge  BLADDER: soft  URETHRA: normal appearing urethra with no masses, tenderness or lesions and normal urethra, normal urethral meatus  VAGINA: Normal  CERVIX: no lesions or cervical motion tenderness  UTERUS: retroverted uterus  ADNEXA: no mass, fullness, tenderness    A/P  1.  Nipple discharge:  right breast US, prolactin today.   Her CMP and TSH normal - reviewed result on her veronique from [employer]  2.  Ovarian cyst/LLQ: check pelvic US  3.  Dysuria;  UA + WBC,, check culture.

## 2025-02-11 ENCOUNTER — HOSPITAL ENCOUNTER (OUTPATIENT)
Dept: RADIOLOGY | Facility: HOSPITAL | Age: 50
Discharge: HOME | End: 2025-02-11
Attending: OBSTETRICS & GYNECOLOGY
Payer: COMMERCIAL

## 2025-02-11 DIAGNOSIS — Z12.31 BREAST CANCER SCREENING BY MAMMOGRAM: ICD-10-CM

## 2025-02-11 DIAGNOSIS — E22.1 HYPERPROLACTINEMIA: Primary | ICD-10-CM

## 2025-02-11 NOTE — PROGRESS NOTES
Her prolactin is high. This can cause nipple discharge.  This hormone is being release in the brain, I have ordered the brain MRI to see if there is a prolactin secreting tumor in the brain    she can wait to have this done after her mammogram.

## 2025-02-12 ENCOUNTER — PATIENT MESSAGE (OUTPATIENT)
Dept: OBSTETRICS AND GYNECOLOGY | Facility: CLINIC | Age: 50
End: 2025-02-12
Payer: COMMERCIAL

## 2025-02-12 LAB — BACTERIA UR CULT: NORMAL

## 2025-02-12 NOTE — PROGRESS NOTES
Your urine culture is negative, you do not have a UTI  I see that you are on oxybutynin for your bladder, are you still taking it as directed?

## 2025-02-13 ENCOUNTER — HOSPITAL ENCOUNTER (OUTPATIENT)
Dept: RADIOLOGY | Facility: HOSPITAL | Age: 50
Discharge: HOME | End: 2025-02-13
Attending: OBSTETRICS & GYNECOLOGY
Payer: COMMERCIAL

## 2025-02-13 DIAGNOSIS — R10.30 LOWER ABDOMINAL PAIN: ICD-10-CM

## 2025-02-13 LAB
FINAL PATHOLOGIC DIAGNOSIS: NORMAL
Lab: NORMAL

## 2025-02-13 PROCEDURE — 76830 TRANSVAGINAL US NON-OB: CPT | Mod: 26,,, | Performed by: STUDENT IN AN ORGANIZED HEALTH CARE EDUCATION/TRAINING PROGRAM

## 2025-02-13 PROCEDURE — 76856 US EXAM PELVIC COMPLETE: CPT | Mod: 26,,, | Performed by: STUDENT IN AN ORGANIZED HEALTH CARE EDUCATION/TRAINING PROGRAM

## 2025-02-13 PROCEDURE — 76856 US EXAM PELVIC COMPLETE: CPT | Mod: TC

## 2025-02-13 NOTE — PROGRESS NOTES
Ultrasound is normal  It showed that she has a small ruptured cyst on the ovary, very small and likely will resolved  She has had many of these in the past.   Please have her f/u with me in one month

## 2025-02-19 ENCOUNTER — RESULTS FOLLOW-UP (OUTPATIENT)
Dept: OBSTETRICS AND GYNECOLOGY | Facility: HOSPITAL | Age: 50
End: 2025-02-19

## 2025-02-26 ENCOUNTER — RESULTS FOLLOW-UP (OUTPATIENT)
Dept: OBSTETRICS AND GYNECOLOGY | Facility: HOSPITAL | Age: 50
End: 2025-02-26

## 2025-02-26 ENCOUNTER — HOSPITAL ENCOUNTER (OUTPATIENT)
Dept: RADIOLOGY | Facility: HOSPITAL | Age: 50
Discharge: HOME | End: 2025-02-26
Attending: OBSTETRICS & GYNECOLOGY
Payer: COMMERCIAL

## 2025-02-26 DIAGNOSIS — Z12.31 BREAST CANCER SCREENING BY MAMMOGRAM: ICD-10-CM

## 2025-02-26 DIAGNOSIS — N64.52 NIPPLE DISCHARGE: ICD-10-CM

## 2025-02-26 PROCEDURE — 77066 DX MAMMO INCL CAD BI: CPT | Mod: 26,,, | Performed by: RADIOLOGY

## 2025-02-26 PROCEDURE — 77062 BREAST TOMOSYNTHESIS BI: CPT | Mod: 26,,, | Performed by: RADIOLOGY

## 2025-02-26 PROCEDURE — 76642 ULTRASOUND BREAST LIMITED: CPT | Mod: TC,RT

## 2025-02-26 PROCEDURE — 76642 ULTRASOUND BREAST LIMITED: CPT | Mod: 26,RT,, | Performed by: RADIOLOGY

## 2025-02-26 PROCEDURE — 77066 DX MAMMO INCL CAD BI: CPT | Mod: TC

## 2025-02-26 NOTE — PROGRESS NOTES
Your mammogram is normal.  A repeat mammogram in 1 year is recommended.  We will see you at your next visit.   Please call the office if you have any questions or concerns.    Take care,  Kell Briggs

## 2025-02-27 ENCOUNTER — HOSPITAL ENCOUNTER (OUTPATIENT)
Dept: RADIOLOGY | Facility: HOSPITAL | Age: 50
Discharge: HOME | End: 2025-02-27
Attending: OBSTETRICS & GYNECOLOGY
Payer: COMMERCIAL

## 2025-02-27 ENCOUNTER — RESULTS FOLLOW-UP (OUTPATIENT)
Dept: OBSTETRICS AND GYNECOLOGY | Facility: CLINIC | Age: 50
End: 2025-02-27

## 2025-02-27 DIAGNOSIS — E22.1 HYPERPROLACTINEMIA: Primary | ICD-10-CM

## 2025-02-27 DIAGNOSIS — E22.1 HYPERPROLACTINEMIA: ICD-10-CM

## 2025-02-27 DIAGNOSIS — E23.6 EMPTY SELLA: ICD-10-CM

## 2025-02-27 PROCEDURE — 25500020 PHARM REV CODE 255: Performed by: OBSTETRICS & GYNECOLOGY

## 2025-02-27 PROCEDURE — 70553 MRI BRAIN STEM W/O & W/DYE: CPT | Mod: 26,,, | Performed by: RADIOLOGY

## 2025-02-27 PROCEDURE — 70553 MRI BRAIN STEM W/O & W/DYE: CPT | Mod: TC

## 2025-02-27 PROCEDURE — A9585 GADOBUTROL INJECTION: HCPCS | Performed by: OBSTETRICS & GYNECOLOGY

## 2025-02-27 RX ORDER — GADOBUTROL 604.72 MG/ML
3 INJECTION INTRAVENOUS
Status: COMPLETED | OUTPATIENT
Start: 2025-02-27 | End: 2025-02-27

## 2025-02-27 RX ADMIN — GADOBUTROL 3 ML: 604.72 INJECTION INTRAVENOUS at 09:02

## 2025-03-13 ENCOUNTER — OFFICE VISIT (OUTPATIENT)
Dept: OBSTETRICS AND GYNECOLOGY | Facility: CLINIC | Age: 50
End: 2025-03-13
Payer: COMMERCIAL

## 2025-03-13 VITALS — SYSTOLIC BLOOD PRESSURE: 110 MMHG | BODY MASS INDEX: 18.87 KG/M2 | WEIGHT: 99.88 LBS | DIASTOLIC BLOOD PRESSURE: 80 MMHG

## 2025-03-13 DIAGNOSIS — E22.1 HYPERPROLACTINEMIA: Primary | ICD-10-CM

## 2025-03-13 DIAGNOSIS — N64.52 NIPPLE DISCHARGE: ICD-10-CM

## 2025-03-13 DIAGNOSIS — E23.6 EMPTY SELLA: ICD-10-CM

## 2025-03-13 PROCEDURE — 99999 PR PBB SHADOW E&M-EST. PATIENT-LVL III: CPT | Mod: PBBFAC,,, | Performed by: OBSTETRICS & GYNECOLOGY

## 2025-03-13 PROCEDURE — 3008F BODY MASS INDEX DOCD: CPT | Mod: CPTII,S$GLB,, | Performed by: OBSTETRICS & GYNECOLOGY

## 2025-03-13 PROCEDURE — 3074F SYST BP LT 130 MM HG: CPT | Mod: CPTII,S$GLB,, | Performed by: OBSTETRICS & GYNECOLOGY

## 2025-03-13 PROCEDURE — 99213 OFFICE O/P EST LOW 20 MIN: CPT | Mod: S$GLB,,, | Performed by: OBSTETRICS & GYNECOLOGY

## 2025-03-13 PROCEDURE — 3079F DIAST BP 80-89 MM HG: CPT | Mod: CPTII,S$GLB,, | Performed by: OBSTETRICS & GYNECOLOGY

## 2025-03-13 PROCEDURE — 1159F MED LIST DOCD IN RCRD: CPT | Mod: CPTII,S$GLB,, | Performed by: OBSTETRICS & GYNECOLOGY

## 2025-03-13 NOTE — PROGRESS NOTES
SUBJECTIVE:   Jaz Hernandez is a 49 y.o. female   for results    She has a few concerns on last visit.   Nipple discharge:   + nipple discharge x one year, with  malodor, clear discharge, spontaneous, only noted on right nipple    2.  LLQ pain x years. There was a concern for diverticulitis? Diverticulosis.  Recently had EGD with c-scope   Seeing GI at [facility].  Pt reported having abdominal pain prior to having BM, she would get constipation then the BM becomes watery and lose, does not feel like she empty it well  Uncertain if she has IBS    3.  H/o ovarian cyst, and has been f/u with [facility].  Her last US in 2023 showed a left dominant follicle.     was told she may have endometriosis,- this was never confirmed.   had two years h/o heavy bleeding which stopped spontaneously- was supposed to have a hysterectomy. F/b [facility]  Was on ocp/depo for period. But still bleeding. Then eventually the bleeding subsided.   Pt reported now period have been monthly and regular   Denies dysmenorrhea    4.  Bladder pain  Full bladder causing pain, when she urinated the pain resolved  Took ditropan with side effects and make it worse.   Also has urgency - going every 30 min to 45 min.   Sometimes having to urinate on her diapers  This onset about one year ago.    Pt was evaluated by Dr Ames , believed this is related to GYN  Pt never confirmed to have endometriosis - was told she may have.  Denies pain with sex, mild cramp with period on day 1- 2   Denies pain with bowel movement.         Narrative & Impression  EXAMINATION:  US PELVIS COMP WITH TRANSVAG NON-OB (XPD)     CLINICAL HISTORY:  Lower abdominal pain, unspecified     TECHNIQUE:  Transabdominal sonography of the pelvis was performed, followed by transvaginal sonography to better evaluate the uterus and ovaries.     COMPARISON:  Ultrasound pelvis 2023.     FINDINGS:  Uterus:     Size: 8.2 x 3.8 x 5.1 cm     Masses: Small intramural uterine fibroid measuring 0.9 x  0.7 x 0.7 cm.     Endometrium: Normal in this pre menopausal patient, measuring 8 mm.     Right ovary:     Size: 2.6 x 1.7 x 1.1 cm     Appearance: Normal     Vascular flow: Normal.     Left ovary:     Size: 2.8 x 2.4 x 1.3 cm     Appearance: Small suspected hemorrhagic cyst measuring 0.9 x 0.6 x 0.8 cm.     Vascular Flow: Normal.     Free Fluid:     None.     Impression:     Small intramural uterine fibroid measuring up to 0.9 cm.        Electronically signed by:Juanito Granda  Date:                                            02/13/2025  Time:                                           10:14  --------------------------------------------------------------------------------------------------------------------------------------------------------------------    Narrative & Impression  EXAMINATION:  MRI BRAIN W WO CONTRAST     CLINICAL HISTORY:  elevated prolactin, nipple discharge;Hyperprolactinemia     TECHNIQUE:  Sagittal and axial T1, axial T2, axial FLAIR, axial gradient, axial diffusion imaging of the whole brain pre-contrast with postcontrast axial T1 and axial spoiled gradient imaging reformatted in the coronal and sagittal planes. Additional thin section imaging of the sella was performed using coronal T2, and sagittal and coronal T1 pre-and postcontrast imaging.Three mL of Gadavist contrast was injected intravenously     COMPARISON:  01/16/2018     FINDINGS:  Finding: Brain parenchyma normal in contour.  Ventricles normal in size without hydrocephalus.  No restricted diffusion to suggest acute infarction.  No abnormal parenchymal susceptibility to suggest parenchymal hemorrhage.  1-2 punctate regions of T2 FLAIR signal hyperintensity within the frontal subcortical white matter which are nonspecific and of doubtful clinical significance this may represent atypical prominent perivascular spaces     Major skull base T2 flow voids are present.  There is no abnormal parenchymal enhancement..     Sella: Prominent fluid  signal along the superior aspect of the sella with flattening of the superior pituitary surface concerning for partially empty sella.  Pituitary measures approximately 0.4 cm in height.  The infundibulum is midline with no focal thickening.     Allowing for flattening of the superior pituitary contour there is no focal signal abnormality or enhancement to suggest definite focal pituitary lesion no significant mass effect on the suprasellar neurovascular structures.     Impression:     MR sella: Probable partially empty sella with prominent fluid signal along the superior aspect of the pituitary     No evidence for focal signal abnormality or enhancement to suggest definite focal pituitary lesion.  Clinical correlation and follow-up as warranted.     MRI brain: Otherwise unremarkable MRI of the brain specifically without evidence for hydrocephalus or significant parenchymal edema signal abnormality.        Electronically signed by:Ravi Castañeda DO  Date:                                            2025  Time:                                           11:43    Past Medical History:   Diagnosis Date    Hyperlipidemia      Past Surgical History:   Procedure Laterality Date     SECTION      TUBAL LIGATION       Social History     Socioeconomic History    Marital status:    Tobacco Use    Smoking status: Never     Social Drivers of Health     Financial Resource Strain: Low Risk  (2/3/2025)    Overall Financial Resource Strain (CARDIA)     Difficulty of Paying Living Expenses: Not very hard   Food Insecurity: No Food Insecurity (2/3/2025)    Hunger Vital Sign     Worried About Running Out of Food in the Last Year: Never true     Ran Out of Food in the Last Year: Never true   Transportation Needs: No Transportation Needs (2023)    PRAPARE - Transportation     Lack of Transportation (Medical): No     Lack of Transportation (Non-Medical): No   Physical Activity: Insufficiently Active (2/3/2025)     Exercise Vital Sign     Days of Exercise per Week: 2 days     Minutes of Exercise per Session: 10 min   Stress: Stress Concern Present (2/3/2025)    [employer] - Occupational Stress Questionnaire     Feeling of Stress : To some extent   Housing Stability: Low Risk  (2023)    Housing Stability Vital Sign     Unable to Pay for Housing in the Last Year: No     Number of Places Lived in the Last Year: 1     Unstable Housing in the Last Year: No     Family History   Problem Relation Name Age of Onset    Breast cancer Neg Hx      Colon cancer Neg Hx      Ovarian cancer Neg Hx       OB History    Para Term  AB Living   3 3 3      SAB IAB Ectopic Multiple Live Births             # Outcome Date GA Lbr Ori/2nd Weight Sex Type Anes PTL Lv   3 Term            2 Term            1 Term                  Current Outpatient Medications   Medication Sig Dispense Refill    oxybutynin (DITROPAN-XL) 10 MG 24 hr tablet Take 1 tablet (10 mg total) by mouth once daily. 90 tablet 0     No current facility-administered medications for this visit.     Allergies: Patient has no known allergies.       ROS:  GENERAL: Denies weight gain or weight loss. Feeling well overall.   SKIN: Denies rash or lesions.   HEAD: Denies head injury or headache.   NODES: Denies enlarged lymph nodes.   CHEST: Denies chest pain or shortness of breath.   CARDIOVASCULAR: Denies palpitations or left sided chest pain.   ABDOMEN: No abdominal pain, constipation, diarrhea, nausea, vomiting or rectal bleeding.   URINARY:see HPI  REPRODUCTIVE:see HPI  BREASTS: The patient performs breast self-examination and denies pain, lumps, or nipple discharge.   HEMATOLOGIC: No easy bruisability or excessive bleeding.  MUSCULOSKELETAL: Denies joint pain or swelling.   NEUROLOGIC: Denies syncope or weakness.   PSYCHIATRIC: Denies depression, anxiety or mood swings.      OBJECTIVE:   /80   Wt 45.3 kg (99 lb 13.9 oz)   LMP  03/12/2025   BMI 18.87 kg/m²   The patient appears well, alert, oriented x 3, in no distress.  Counseling appt only      ASSESSMENT:   .There are no diagnoses linked to this encounter.      1. Health maintenance  -pap done. Discussed ASCCP guideline screening every 3 - 5 years.   -screening MMG ordered  -colonoscopy ordered  -counseled on exercise and healthy diet, weight loss  -bone health:  Discussed Vitamin D and Calcium supplementation, weight bearing exercises  2.  Discussed safety at home/school/work, healthy and balanced diet, sleep hygiene, as well as violence/weapons exposure.       A/P  1.  Nipple discharge: elevated prolactin, MRI done -with partial empty sella - referred to endocrine appt in July 2025  2.  Lleft ovary with hemorrhagic cyst 0.9 cm: small and likely will resolve. No need to f/u  3.  Bladder pain:  worsening with ditropan. No longer taking them.  Discussed options of EDENILSON if bladder pain due to endometriosis - should see some improvement.  Pt declined and prefer to continue pelvic floor exercises at home.   Declined further referral to pelvic floor or urogyn  4.  F/u with me in one year

## 2025-07-15 ENCOUNTER — OFFICE VISIT (OUTPATIENT)
Dept: ENDOCRINOLOGY | Facility: CLINIC | Age: 50
End: 2025-07-15
Payer: COMMERCIAL

## 2025-07-15 ENCOUNTER — LAB VISIT (OUTPATIENT)
Dept: LAB | Facility: HOSPITAL | Age: 50
End: 2025-07-15
Attending: OBSTETRICS & GYNECOLOGY
Payer: COMMERCIAL

## 2025-07-15 VITALS
BODY MASS INDEX: 19.32 KG/M2 | HEIGHT: 61 IN | RESPIRATION RATE: 18 BRPM | DIASTOLIC BLOOD PRESSURE: 84 MMHG | WEIGHT: 102.31 LBS | HEART RATE: 72 BPM | SYSTOLIC BLOOD PRESSURE: 140 MMHG

## 2025-07-15 DIAGNOSIS — N64.3 GALACTORRHEA: ICD-10-CM

## 2025-07-15 DIAGNOSIS — E22.1 HYPERPROLACTINEMIA: ICD-10-CM

## 2025-07-15 DIAGNOSIS — N64.3 GALACTORRHEA: Primary | ICD-10-CM

## 2025-07-15 DIAGNOSIS — E23.6 EMPTY SELLA: ICD-10-CM

## 2025-07-15 DIAGNOSIS — E78.01 FAMILIAL HYPERCHOLESTEROLEMIA: ICD-10-CM

## 2025-07-15 LAB
ALBUMIN SERPL BCP-MCNC: 4.6 G/DL (ref 3.5–5.2)
ALP SERPL-CCNC: 50 UNIT/L (ref 40–150)
ALT SERPL W/O P-5'-P-CCNC: 32 UNIT/L (ref 10–44)
ANION GAP (OHS): 7 MMOL/L (ref 8–16)
AST SERPL-CCNC: 29 UNIT/L (ref 11–45)
BILIRUB SERPL-MCNC: 0.5 MG/DL (ref 0.1–1)
BUN SERPL-MCNC: 17 MG/DL (ref 6–20)
CALCIUM SERPL-MCNC: 9.6 MG/DL (ref 8.7–10.5)
CHLORIDE SERPL-SCNC: 111 MMOL/L (ref 95–110)
CHOLEST SERPL-MCNC: 143 MG/DL (ref 120–199)
CHOLEST/HDLC SERPL: 1.9 {RATIO} (ref 2–5)
CO2 SERPL-SCNC: 23 MMOL/L (ref 23–29)
CREAT SERPL-MCNC: 0.7 MG/DL (ref 0.5–1.4)
GFR SERPLBLD CREATININE-BSD FMLA CKD-EPI: >60 ML/MIN/1.73/M2
GLUCOSE SERPL-MCNC: 84 MG/DL (ref 70–110)
HDLC SERPL-MCNC: 77 MG/DL (ref 40–75)
HDLC SERPL: 53.8 % (ref 20–50)
LDLC SERPL CALC-MCNC: 54 MG/DL (ref 63–159)
NONHDLC SERPL-MCNC: 66 MG/DL
POTASSIUM SERPL-SCNC: 4.5 MMOL/L (ref 3.5–5.1)
PROLACTIN SERPL IA-MCNC: 96 NG/ML (ref 5.2–26.5)
PROT SERPL-MCNC: 8.1 GM/DL (ref 6–8.4)
SODIUM SERPL-SCNC: 141 MMOL/L (ref 136–145)
TRIGL SERPL-MCNC: 60 MG/DL (ref 30–150)

## 2025-07-15 PROCEDURE — G2211 COMPLEX E/M VISIT ADD ON: HCPCS | Mod: S$GLB,,, | Performed by: INTERNAL MEDICINE

## 2025-07-15 PROCEDURE — 84305 ASSAY OF SOMATOMEDIN: CPT

## 2025-07-15 PROCEDURE — 84146 ASSAY OF PROLACTIN: CPT

## 2025-07-15 PROCEDURE — 99999 PR PBB SHADOW E&M-EST. PATIENT-LVL IV: CPT | Mod: PBBFAC,,, | Performed by: INTERNAL MEDICINE

## 2025-07-15 PROCEDURE — 3079F DIAST BP 80-89 MM HG: CPT | Mod: CPTII,S$GLB,, | Performed by: INTERNAL MEDICINE

## 2025-07-15 PROCEDURE — 3008F BODY MASS INDEX DOCD: CPT | Mod: CPTII,S$GLB,, | Performed by: INTERNAL MEDICINE

## 2025-07-15 PROCEDURE — 99204 OFFICE O/P NEW MOD 45 MIN: CPT | Mod: S$GLB,,, | Performed by: INTERNAL MEDICINE

## 2025-07-15 PROCEDURE — 36415 COLL VENOUS BLD VENIPUNCTURE: CPT

## 2025-07-15 PROCEDURE — 82040 ASSAY OF SERUM ALBUMIN: CPT

## 2025-07-15 PROCEDURE — 1159F MED LIST DOCD IN RCRD: CPT | Mod: CPTII,S$GLB,, | Performed by: INTERNAL MEDICINE

## 2025-07-15 PROCEDURE — 82465 ASSAY BLD/SERUM CHOLESTEROL: CPT

## 2025-07-15 PROCEDURE — 3077F SYST BP >= 140 MM HG: CPT | Mod: CPTII,S$GLB,, | Performed by: INTERNAL MEDICINE

## 2025-07-15 RX ORDER — PANTOPRAZOLE SODIUM 20 MG/1
20 TABLET, DELAYED RELEASE ORAL DAILY
COMMUNITY
Start: 2025-01-15

## 2025-07-15 RX ORDER — CABERGOLINE 0.5 MG/1
0.25 TABLET ORAL
Qty: 4 TABLET | Refills: 3 | Status: SHIPPED | OUTPATIENT
Start: 2025-07-17 | End: 2026-07-17

## 2025-07-15 RX ORDER — ROSUVASTATIN CALCIUM 20 MG/1
20 TABLET, COATED ORAL DAILY
COMMUNITY
Start: 2025-01-16

## 2025-07-15 RX ORDER — EZETIMIBE 10 MG/1
10 TABLET ORAL DAILY
COMMUNITY
Start: 2025-01-16

## 2025-07-15 NOTE — PROGRESS NOTES
"ENDOCRINOLOGY INITIAL VISIT  07/15/2025    Reason for Visit:  referred by Roger Briggs MD for evaluation of hyperPRL and partially empty sella    HPI:  Jaz Hernandez is a 50 y.o. female     Presented with symptoms of right>left sided galactorrhea this began 9 months ago. Initially thought it was related to sweating, but realized it was watery milk. This is spontaneous. Chronic headaches and dizziness, this is unchanged and typically provoked with direct sun exposure. Denies any blood, or green/yellow discharge. It is not painful.   Interventions have included: cabergoline started:    Elevated prolactin levels:  IGF 1 levels were not checked.    Latest Reference Range & Units 02/10/25 13:15   Prolactin 5.2 - 26.5 ng/mL 52.0 (H)     TSH 1.7 1/2025 ([facility])    Denies headaches or change to vision.     Menarche occurred  Last menstrual cycle:  These occur every 28 - 32 days, occasionally every 25 days. Heavy for a few days then lighter.   Used EDENILSON, depo provera, and restarted EDENILSON.   She is currently not on hormonal contraceptive pills.       Last MRI    Denies history of hypothyroidism, chest wall injury, or chronic kidney disease.     Past Medical History:  Hyperlipidemia   GERD     Medications:  Her only medications: ezetimibe, rosuvastatin and pantoprazole.   Denies any antipsychotic, motility medications    Father with graves' disease           Review of patient's allergies indicates:  No Known Allergies    Current Medications[1]      ROS: see HPI       PHYSICAL EXAM  BP (!) 140/84 (BP Location: Right arm, Patient Position: Sitting)   Pulse 72   Resp 18   Ht 5' 1" (1.549 m)   Wt 46.4 kg (102 lb 4.7 oz)   LMP 06/22/2025   BMI 19.33 kg/m²   Wt Readings from Last 3 Encounters:   07/15/25 46.4 kg (102 lb 4.7 oz)   03/13/25 45.3 kg (99 lb 13.9 oz)   02/10/25 44.7 kg (98 lb 8.7 oz)       Constitutional:  Pleasant,  in no acute distress.   HENT:   Head:    Normocephalic and atraumatic.   Eyes:    EOMI. No " scleral icterus.   Neck:    No thyromegaly or palpable thyroid nodules, no cervical LAD  Breast exam: no galactorrhea       IMAGING STUDIES    ASSESSMENT/PLAN    Problem List Items Addressed This Visit          Unprioritized    Galactorrhea - Primary    Indications for treatment:   Bothersome galactorrhea   Cycles are regular, not interested in fertility, no macro or micro on pit MRI    Discussed benefits and risks  Will try treatment for three months   Cabergoline 0.25 mg twice weekly  Repeat PRL in six weeks. ([facility])         Relevant Medications    cabergoline (DOSTINEX) 0.5 mg tablet (Start on 7/17/2025)    Other Relevant Orders    Insulin-Like Growth Factor    Prolactin     Other Visit Diagnoses         Hyperprolactinemia        Relevant Orders    Prolactin      Empty sella        Relevant Orders    Prolactin      Familial hypercholesterolemia        Relevant Orders    Lipid Panel    Comprehensive Metabolic Panel            RTC 12 months     Kathy Childers MD         [1]   Current Outpatient Medications:     ezetimibe (ZETIA) 10 mg tablet, 10 mg once daily. Take 1 table at bedtime daily, Disp: , Rfl:     pantoprazole (PROTONIX) 20 MG tablet, 20 mg once daily. Take 1 tablet daily., Disp: , Rfl:     rosuvastatin (CRESTOR) 20 MG tablet, Take 20 mg by mouth once daily. Take 1 table at bedtime, Disp: , Rfl:     [START ON 7/17/2025] cabergoline (DOSTINEX) 0.5 mg tablet, Take 0.5 tablets (0.25 mg total) by mouth twice a week., Disp: 4 tablet, Rfl: 3    oxybutynin (DITROPAN-XL) 10 MG 24 hr tablet, Take 1 tablet (10 mg total) by mouth once daily., Disp: 90 tablet, Rfl: 0

## 2025-07-15 NOTE — ASSESSMENT & PLAN NOTE
Indications for treatment:   Bothersome galactorrhea   Cycles are regular, not interested in fertility, no macro or micro on pit MRI    Discussed benefits and risks  Will try treatment for three months   Cabergoline 0.25 mg twice weekly  Repeat PRL in six weeks. ([facility])

## 2025-07-15 NOTE — PATIENT INSTRUCTIONS
Start cabergoline 0.25 mg (1/2 tablet) twice a week.  It is best if you take it at bedtime with a small healthy snack so you can sleep through any possible side effects.  I recommend using a pill box for this to make sure you are not missing any doses.  If later in the week you notice that you have missed a pill you can always make it up by taking it on a different day.  The important thing is that you take the same amount each week.      Sometimes cabergoline can cause a runny nose, dizziness, and upset stomach but it is usually not a problem if you take it at night with some food.  Please let me know if you have any bad side effects or if you are having trouble getting the medication.      At much higher doses this medication has been used to treat other conditions like Parkinson's disease and at those high doses there have been some reports of behavior changes like excessive gambling or shopping.  We almost never see that happen with the small doses we use to treat high prolactin but if you notice any of these changes please let me know right away.

## 2025-07-22 LAB — MAYO GENERIC ORDERABLE RESULT: NORMAL

## 2025-08-14 ENCOUNTER — TELEPHONE (OUTPATIENT)
Dept: ENDOCRINOLOGY | Facility: CLINIC | Age: 50
End: 2025-08-14
Payer: COMMERCIAL

## 2025-08-20 LAB
25(OH)D3+25(OH)D2 SERPL-MCNC: 61 NG/ML (ref 30–100)
ALBUMIN SERPL-MCNC: 4.6 G/DL (ref 3.6–5.1)
ALBUMIN/GLOB SERPL: 1.6 (CALC) (ref 1–2.5)
ALP SERPL-CCNC: 53 U/L (ref 37–153)
ALT SERPL-CCNC: 32 U/L (ref 6–29)
AST SERPL-CCNC: 29 U/L (ref 10–35)
BASOPHILS # BLD AUTO: 38 CELLS/UL (ref 0–200)
BASOPHILS NFR BLD AUTO: 0.8 %
BILIRUB SERPL-MCNC: 0.6 MG/DL (ref 0.2–1.2)
BUN SERPL-MCNC: 14 MG/DL (ref 7–25)
BUN/CREAT SERPL: ABNORMAL (CALC) (ref 6–22)
CALCIUM SERPL-MCNC: 9.6 MG/DL (ref 8.6–10.4)
CHLORIDE SERPL-SCNC: 104 MMOL/L (ref 98–110)
CHOLEST SERPL-MCNC: 132 MG/DL
CHOLEST/HDLC SERPL: 1.7 (CALC)
CO2 SERPL-SCNC: 26 MMOL/L (ref 20–32)
COMMENT: NORMAL
CREAT SERPL-MCNC: 0.68 MG/DL (ref 0.5–1.03)
EGFR: 106 ML/MIN/1.73M2
EOSINOPHIL # BLD AUTO: 288 CELLS/UL (ref 15–500)
EOSINOPHIL NFR BLD AUTO: 6 %
ERYTHROCYTE [DISTWIDTH] IN BLOOD BY AUTOMATED COUNT: 13 % (ref 11–15)
GLOBULIN SER CALC-MCNC: 2.9 G/DL (CALC) (ref 1.9–3.7)
GLUCOSE SERPL-MCNC: 77 MG/DL (ref 65–99)
HAV IGM SERPL QL IA: NORMAL
HBV CORE IGM SERPL QL IA: NORMAL
HBV SURFACE AG SERPL QL IA: NORMAL
HCT VFR BLD AUTO: 45.1 % (ref 35–45)
HCV AB SERPL QL IA: NORMAL
HDLC SERPL-MCNC: 80 MG/DL
HGB BLD-MCNC: 14.5 G/DL (ref 11.7–15.5)
LDLC SERPL CALC-MCNC: 38 MG/DL (CALC)
LYMPHOCYTES # BLD AUTO: 1459 CELLS/UL (ref 850–3900)
LYMPHOCYTES NFR BLD AUTO: 30.4 %
MCH RBC QN AUTO: 30.9 PG (ref 27–33)
MCHC RBC AUTO-ENTMCNC: 32.2 G/DL (ref 32–36)
MCV RBC AUTO: 96.2 FL (ref 80–100)
MONOCYTES # BLD AUTO: 470 CELLS/UL (ref 200–950)
MONOCYTES NFR BLD AUTO: 9.8 %
NEUTROPHILS # BLD AUTO: 2544 CELLS/UL (ref 1500–7800)
NEUTROPHILS NFR BLD AUTO: 53 %
NONHDLC SERPL-MCNC: 52 MG/DL (CALC)
PLATELET # BLD AUTO: 318 THOUSAND/UL (ref 140–400)
PMV BLD REES-ECKER: 9.5 FL (ref 7.5–12.5)
POTASSIUM SERPL-SCNC: 4.3 MMOL/L (ref 3.5–5.3)
PROLACTIN SERPL-MCNC: 4.7 NG/ML
PROT SERPL-MCNC: 7.5 G/DL (ref 6.1–8.1)
RBC # BLD AUTO: 4.69 MILLION/UL (ref 3.8–5.1)
SODIUM SERPL-SCNC: 138 MMOL/L (ref 135–146)
T3FREE SERPL-MCNC: 2.9 PG/ML (ref 2.3–4.2)
T4 FREE SERPL-MCNC: 1.2 NG/DL (ref 0.8–1.8)
TRIGL SERPL-MCNC: 62 MG/DL
TSH SERPL-ACNC: 1.09 MIU/L
WBC # BLD AUTO: 4.8 THOUSAND/UL (ref 3.8–10.8)